# Patient Record
(demographics unavailable — no encounter records)

---

## 2025-07-25 NOTE — ASSESSMENT
[FreeTextEntry1] : PT, meloxicam 15 mg  Will discuss MRI f/u 6 weeks  NSAIDs- Patient warned of risk of medication to GI tract, increased blood pressure, cardiac risk, and risk of fluid retention.  Advised to clear medication with internist or PCP if any concurrent health problem with heart, blood pressure, or GI system exists.

## 2025-07-25 NOTE — HISTORY OF PRESENT ILLNESS
[de-identified] : 07/25/2025 50 year y/o F here for an evaluation of Her lower back, no reported injury. Patient states pain started a few months ago and has worsened over time; denies n/t. No radicular complaints.  No bb dysfunction. Will occasionally have pain when rolling over in bed.

## 2025-07-25 NOTE — IMAGING
[Facet arthropathy] : Facet arthropathy [Disc space narrowing] : Disc space narrowing [AP] : anteroposterior [There are no fractures, subluxations or dislocations. No significant abnormalities are seen] : There are no fractures, subluxations or dislocations. No significant abnormalities are seen [de-identified] : LSPINE Inspection: No rash or ecchymosis Palpation: No tenderness to palpation or spasm in bilateral thoracic and lumbar paraspinal musculature, L>R SI joint tenderness to palpation ROM: Full with mild pain Strength: 5/5 bilateral hip flexors, knee extensors, ankle dorsiflexors, EHL, ankle plantarflexors Sensation: Sensation present to light touch bilateral L2-S1 distributions Provocative maneuvers: Negative bilateral straight leg raise  *b/l* Hip- Palpation: L>R Tenderness to palpation over greater trochanter and IT band ROM: No groin pain with flexion and internal rotation